# Patient Record
Sex: FEMALE | Race: BLACK OR AFRICAN AMERICAN | NOT HISPANIC OR LATINO | Employment: PART TIME | URBAN - METROPOLITAN AREA
[De-identification: names, ages, dates, MRNs, and addresses within clinical notes are randomized per-mention and may not be internally consistent; named-entity substitution may affect disease eponyms.]

---

## 2023-11-14 ENCOUNTER — HOSPITAL ENCOUNTER (EMERGENCY)
Facility: HOSPITAL | Age: 23
Discharge: HOME | End: 2023-11-14
Attending: EMERGENCY MEDICINE
Payer: COMMERCIAL

## 2023-11-14 ENCOUNTER — APPOINTMENT (OUTPATIENT)
Dept: RADIOLOGY | Facility: HOSPITAL | Age: 23
End: 2023-11-14
Payer: COMMERCIAL

## 2023-11-14 VITALS
HEIGHT: 59 IN | WEIGHT: 180 LBS | TEMPERATURE: 97 F | SYSTOLIC BLOOD PRESSURE: 119 MMHG | OXYGEN SATURATION: 98 % | DIASTOLIC BLOOD PRESSURE: 71 MMHG | HEART RATE: 88 BPM | RESPIRATION RATE: 18 BRPM | BODY MASS INDEX: 36.29 KG/M2

## 2023-11-14 DIAGNOSIS — O03.9 COMPLETE MISCARRIAGE (HHS-HCC): Primary | ICD-10-CM

## 2023-11-14 LAB
ABO GROUP (TYPE) IN BLOOD: NORMAL
ALBUMIN SERPL BCP-MCNC: 4.1 G/DL (ref 3.4–5)
ALP SERPL-CCNC: 75 U/L (ref 33–110)
ALT SERPL W P-5'-P-CCNC: 9 U/L (ref 7–45)
ANION GAP SERPL CALC-SCNC: 13 MMOL/L (ref 10–20)
APPEARANCE UR: ABNORMAL
AST SERPL W P-5'-P-CCNC: 13 U/L (ref 9–39)
B-HCG SERPL-ACNC: ABNORMAL MIU/ML
BACTERIA #/AREA URNS AUTO: ABNORMAL /HPF
BASOPHILS # BLD AUTO: 0.03 X10*3/UL (ref 0–0.1)
BASOPHILS NFR BLD AUTO: 0.3 %
BILIRUB SERPL-MCNC: 0.5 MG/DL (ref 0–1.2)
BILIRUB UR STRIP.AUTO-MCNC: NEGATIVE MG/DL
BUN SERPL-MCNC: 6 MG/DL (ref 6–23)
CALCIUM SERPL-MCNC: 9.3 MG/DL (ref 8.6–10.3)
CHLORIDE SERPL-SCNC: 106 MMOL/L (ref 98–107)
CO2 SERPL-SCNC: 22 MMOL/L (ref 21–32)
COLOR UR: YELLOW
CREAT SERPL-MCNC: 0.61 MG/DL (ref 0.5–1.05)
EOSINOPHIL # BLD AUTO: 0.01 X10*3/UL (ref 0–0.7)
EOSINOPHIL NFR BLD AUTO: 0.1 %
ERYTHROCYTE [DISTWIDTH] IN BLOOD BY AUTOMATED COUNT: 16.1 % (ref 11.5–14.5)
GFR SERPL CREATININE-BSD FRML MDRD: >90 ML/MIN/1.73M*2
GLUCOSE SERPL-MCNC: 90 MG/DL (ref 74–99)
GLUCOSE UR STRIP.AUTO-MCNC: NEGATIVE MG/DL
HCT VFR BLD AUTO: 37.6 % (ref 36–46)
HGB BLD-MCNC: 12 G/DL (ref 12–16)
HOLD SPECIMEN: NORMAL
IMM GRANULOCYTES # BLD AUTO: 0.05 X10*3/UL (ref 0–0.7)
IMM GRANULOCYTES NFR BLD AUTO: 0.4 % (ref 0–0.9)
KETONES UR STRIP.AUTO-MCNC: ABNORMAL MG/DL
LEUKOCYTE ESTERASE UR QL STRIP.AUTO: NEGATIVE
LYMPHOCYTES # BLD AUTO: 2.13 X10*3/UL (ref 1.2–4.8)
LYMPHOCYTES NFR BLD AUTO: 18.5 %
MCH RBC QN AUTO: 25.8 PG (ref 26–34)
MCHC RBC AUTO-ENTMCNC: 31.9 G/DL (ref 32–36)
MCV RBC AUTO: 81 FL (ref 80–100)
MONOCYTES # BLD AUTO: 0.51 X10*3/UL (ref 0.1–1)
MONOCYTES NFR BLD AUTO: 4.4 %
MUCOUS THREADS #/AREA URNS AUTO: ABNORMAL /LPF
NEUTROPHILS # BLD AUTO: 8.77 X10*3/UL (ref 1.2–7.7)
NEUTROPHILS NFR BLD AUTO: 76.3 %
NITRITE UR QL STRIP.AUTO: NEGATIVE
NRBC BLD-RTO: 0 /100 WBCS (ref 0–0)
PH UR STRIP.AUTO: 6 [PH]
PLATELET # BLD AUTO: 246 X10*3/UL (ref 150–450)
POTASSIUM SERPL-SCNC: 4.3 MMOL/L (ref 3.5–5.3)
PROT SERPL-MCNC: 6.6 G/DL (ref 6.4–8.2)
PROT UR STRIP.AUTO-MCNC: ABNORMAL MG/DL
RBC # BLD AUTO: 4.65 X10*6/UL (ref 4–5.2)
RBC # UR STRIP.AUTO: ABNORMAL /UL
RBC #/AREA URNS AUTO: >20 /HPF
RH FACTOR (ANTIGEN D): NORMAL
SODIUM SERPL-SCNC: 137 MMOL/L (ref 136–145)
SP GR UR STRIP.AUTO: 1
SQUAMOUS #/AREA URNS AUTO: ABNORMAL /HPF
UROBILINOGEN UR STRIP.AUTO-MCNC: <2 MG/DL
WBC # BLD AUTO: 11.5 X10*3/UL (ref 4.4–11.3)
WBC #/AREA URNS AUTO: ABNORMAL /HPF

## 2023-11-14 PROCEDURE — 76856 US EXAM PELVIC COMPLETE: CPT

## 2023-11-14 PROCEDURE — 84075 ASSAY ALKALINE PHOSPHATASE: CPT | Performed by: EMERGENCY MEDICINE

## 2023-11-14 PROCEDURE — 81001 URINALYSIS AUTO W/SCOPE: CPT | Performed by: EMERGENCY MEDICINE

## 2023-11-14 PROCEDURE — 87086 URINE CULTURE/COLONY COUNT: CPT | Mod: PARLAB | Performed by: EMERGENCY MEDICINE

## 2023-11-14 PROCEDURE — 84702 CHORIONIC GONADOTROPIN TEST: CPT | Performed by: EMERGENCY MEDICINE

## 2023-11-14 PROCEDURE — 99285 EMERGENCY DEPT VISIT HI MDM: CPT | Mod: 25 | Performed by: EMERGENCY MEDICINE

## 2023-11-14 PROCEDURE — 99284 EMERGENCY DEPT VISIT MOD MDM: CPT | Mod: 25

## 2023-11-14 PROCEDURE — 36415 COLL VENOUS BLD VENIPUNCTURE: CPT | Performed by: EMERGENCY MEDICINE

## 2023-11-14 PROCEDURE — 76815 OB US LIMITED FETUS(S): CPT | Performed by: RADIOLOGY

## 2023-11-14 PROCEDURE — 86901 BLOOD TYPING SEROLOGIC RH(D): CPT | Performed by: EMERGENCY MEDICINE

## 2023-11-14 PROCEDURE — 85025 COMPLETE CBC W/AUTO DIFF WBC: CPT | Performed by: EMERGENCY MEDICINE

## 2023-11-14 ASSESSMENT — COLUMBIA-SUICIDE SEVERITY RATING SCALE - C-SSRS
6. HAVE YOU EVER DONE ANYTHING, STARTED TO DO ANYTHING, OR PREPARED TO DO ANYTHING TO END YOUR LIFE?: NO
1. IN THE PAST MONTH, HAVE YOU WISHED YOU WERE DEAD OR WISHED YOU COULD GO TO SLEEP AND NOT WAKE UP?: NO
2. HAVE YOU ACTUALLY HAD ANY THOUGHTS OF KILLING YOURSELF?: NO

## 2023-11-14 ASSESSMENT — PAIN - FUNCTIONAL ASSESSMENT: PAIN_FUNCTIONAL_ASSESSMENT: 0-10

## 2023-11-14 ASSESSMENT — PAIN SCALES - GENERAL: PAINLEVEL_OUTOF10: 0 - NO PAIN

## 2023-11-14 NOTE — ED TRIAGE NOTES
Pt presents to ED c/o heavy vaginal bleeding following a positive pregnancy test 2 days ago. Pt states last menstrual period was at the end of September. Pt states bleeding began today. blood is dark red with small clots. Pt soaked through 1 pad in 2 hours. Pt reports abdominal cramping that began today.

## 2023-11-14 NOTE — ED PROVIDER NOTES
HPI   Chief Complaint   Patient presents with    Vaginal Bleeding - Pregnant       23-year-old female who is a G2, P1 who is early in pregnancy who presents with vaginal bleeding.  Patient states she took a pregnancy test 2 days ago found out she was pregnant.  Today she started having dark blood present.  She is having lower abdominal cramping.  Denies any back pain.  Denies any dysuria, frequency or urgency.  Her previous pregnancy was an .                          No data recorded                Patient History   Past Medical History:   Diagnosis Date    Asthma      History reviewed. No pertinent surgical history.  No family history on file.  Social History     Tobacco Use    Smoking status: Never    Smokeless tobacco: Never   Substance Use Topics    Alcohol use: Not Currently    Drug use: Never       Physical Exam   ED Triage Vitals [23 1654]   Temp Heart Rate Resp BP   36.1 °C (97 °F) 95 20 130/74      SpO2 Temp Source Heart Rate Source Patient Position   100 % Tympanic Monitor Sitting      BP Location FiO2 (%)     Right arm --       Physical Exam  Constitutional:       Appearance: Normal appearance. She is normal weight.   HENT:      Head: Normocephalic and atraumatic.      Nose: Nose normal.      Mouth/Throat:      Mouth: Mucous membranes are moist.      Pharynx: Oropharynx is clear.   Eyes:      Extraocular Movements: Extraocular movements intact.      Conjunctiva/sclera: Conjunctivae normal.      Pupils: Pupils are equal, round, and reactive to light.   Cardiovascular:      Rate and Rhythm: Normal rate and regular rhythm.   Pulmonary:      Effort: Pulmonary effort is normal.      Breath sounds: Normal breath sounds.   Abdominal:      General: Abdomen is flat. Bowel sounds are normal.      Palpations: Abdomen is soft.   Musculoskeletal:         General: Normal range of motion.      Cervical back: Normal range of motion and neck supple.   Skin:     General: Skin is warm and dry.      Capillary  Refill: Capillary refill takes less than 2 seconds.   Neurological:      General: No focal deficit present.      Mental Status: She is alert.   Psychiatric:         Mood and Affect: Mood normal.         Behavior: Behavior normal.         Thought Content: Thought content normal.         Judgment: Judgment normal.       Labs Reviewed   CBC WITH AUTO DIFFERENTIAL - Abnormal       Result Value    WBC 11.5 (*)     nRBC 0.0      RBC 4.65      Hemoglobin 12.0      Hematocrit 37.6      MCV 81      MCH 25.8 (*)     MCHC 31.9 (*)     RDW 16.1 (*)     Platelets 246      Neutrophils % 76.3      Immature Granulocytes %, Automated 0.4      Lymphocytes % 18.5      Monocytes % 4.4      Eosinophils % 0.1      Basophils % 0.3      Neutrophils Absolute 8.77 (*)     Immature Granulocytes Absolute, Automated 0.05      Lymphocytes Absolute 2.13      Monocytes Absolute 0.51      Eosinophils Absolute 0.01      Basophils Absolute 0.03     HUMAN CHORIONIC GONADOTROPIN, SERUM QUANTITATIVE - Abnormal    HCG, Beta-Quantitative 35,642 (*)     Narrative:      Total HCG measurement is performed using the [employer] Access   Immunoassay which detects intact HCG and free beta HCG subunit.    This test is not indicated for use as a tumor marker.   HCG testing is performed using a different test methodology at [facility] than other [facility]. Direct result comparison   should only be made within the same method.       COMPREHENSIVE METABOLIC PANEL - Normal    Glucose 90      Sodium 137      Potassium 4.3      Chloride 106      Bicarbonate 22      Anion Gap 13      Urea Nitrogen 6      Creatinine 0.61      eGFR >90      Calcium 9.3      Albumin 4.1      Alkaline Phosphatase 75      Total Protein 6.6      AST 13      Bilirubin, Total 0.5      ALT 9     ABORH    ABO TYPE O      Rh TYPE POS     URINALYSIS WITH REFLEX MICROSCOPIC AND CULTURE    Narrative:     The following orders were created for panel order Urinalysis with  Reflex Microscopic and Culture.  Procedure                               Abnormality         Status                     ---------                               -----------         ------                     Urinalysis with Reflex Mi...[[id number]]                      In process                 Extra Urine Gray Tube[[id number]]                             In process                   Please view results for these tests on the individual orders.   URINALYSIS WITH REFLEX MICROSCOPIC AND CULTURE   EXTRA URINE GRAY TUBE   URINALYSIS MICROSCOPIC WITH REFLEX CULTURE       US PELVIS TRANSABDOMINAL WITH TRANSVAGINAL   Final Result   An intrauterine gestational sac is not identified.  If there is a   positive serum beta HCG this represents a pregnancy of unknown   location. Differential considerations include: early nonvisualized   intrauterine pregnancy, failed intrauterine pregnancy, nonvisualized   occult ectopic pregnancy. Follow-up serial beta HCG and pelvic   ultrasound recommended to accurately assess pregnancy status.        Small amount of heterogeneous complex fluid within the endocervical   canal may relate to hemorrhagic content given provided history.        Unremarkable sonographic appearance of the ovaries.        MACRO:   None        Signed by: Ruby Mojica 11/14/2023 7:12 PM   Dictation workstation:   [id number]            ED Course & MDM   Diagnoses as of 11/14/23 2011   Complete miscarriage       Medical Decision Making  Emergency department course, on reevaluation of the patient she is having no pain.  Her quantitative hCG came back at 35,642.  Trends vaginal ultrasound showed no intrauterine gestational sac identified there is a small amount of complex fluid within the endocervical canal.  Blood type is O+.  With the elevated hCG and no findings on ultrasound will discuss with OB/GYN at [facility].  Patient currently does not have an OB she is seeing.  I discussed the case with [facility] OB   Francheska, who recommended outpatient follow-up since her exam is benign with no pain.  Patient has been instructed to follow-up for repeat quant's with an OB/GYN.  Patient is agreeable with this plan.  We will give her one of our local GYNs here at [place].        Procedure  Procedures     Lucila Mcghee DO  11/14/23 2012

## 2023-11-17 LAB — BACTERIA UR CULT: ABNORMAL

## 2023-11-18 ENCOUNTER — TELEPHONE (OUTPATIENT)
Dept: PHARMACY | Facility: HOSPITAL | Age: 23
End: 2023-11-18
Payer: COMMERCIAL

## 2023-11-18 DIAGNOSIS — B99.9 INFECTION: Primary | ICD-10-CM

## 2023-11-18 RX ORDER — CEPHALEXIN 500 MG/1
500 CAPSULE ORAL 4 TIMES DAILY
Qty: 28 CAPSULE | Refills: 0 | Status: SHIPPED | OUTPATIENT
Start: 2023-11-18 | End: 2023-11-20

## 2023-11-18 NOTE — PROGRESS NOTES
EDPD Note: Bug-Drug Mismatch    Contacted Ms. Chiquita Odonnell regarding a positive urine culture/result that was taken during their recent emergency room visit. I completed education with patient. The patient is not being treated appropriately.    Patient reports that she may have a miscarriage but is uncertain and it was not confirmed at the ED. She has an appointment with OBGYN on  for further evaluation. She reports continued bleeding, abdominal pain and flank pain and denied urinary symptoms. Due to current pregnancy (unconfirmed miscarriage) and susceptibility for her pyelonephritis, plan to treat with cephalexin 500mg QID x 7 days as the safest option until she has her OBGYN appointment and is told otherwise.    The following prescription was sent to the patient's preferred pharmacy. No further follow up needed from EDPD Team.   Drug: cephalexin 500mg   Si tab PO QID x 7 days  Days Supply: 7   Pharmacy: [place], [place]    Susceptibility data from last 90 days.  Collected Specimen Info Organism Amoxicillin/Clavulanate Ampicillin Ampicillin/Sulbactam Cefazolin Cefazolin (uncomplicated UTIs only) Ciprofloxacin Gentamicin Nitrofurantoin Piperacillin/Tazobactam Trimethoprim/Sulfamethoxazole   23 Urine from Clean Catch/Voided Klebsiella pneumoniae/variicola S R S S S S S S S S       Marilyn Walsh, PharmD      Patient called back on 2023 because her [facility] (at [address] [place] [address]) told her they never received the prescription for Keflex. The Keflex order was discontinued per [facility] chart review. Patient confirmed that she has had no urinary Sx, and never did. Unfortunately, she has experienced a miscarriage.  I explained to her that since she is not having any Sx, that antibiotics would not be warranted at this time. If Sx do develop, she should reach out to her PCP, reach out to the Culture Callback Team, or return to the ER for further workup/treatment. Patient expressed understanding  and had no further questions.    Violeta Bryson, PharmD

## 2023-11-20 ENCOUNTER — LAB (OUTPATIENT)
Dept: LAB | Facility: LAB | Age: 23
End: 2023-11-20
Payer: COMMERCIAL

## 2023-11-20 ENCOUNTER — OFFICE VISIT (OUTPATIENT)
Dept: OBSTETRICS AND GYNECOLOGY | Facility: CLINIC | Age: 23
End: 2023-11-20
Payer: COMMERCIAL

## 2023-11-20 DIAGNOSIS — Z30.09 BIRTH CONTROL COUNSELING: ICD-10-CM

## 2023-11-20 DIAGNOSIS — O36.80X0 PREGNANCY OF UNKNOWN ANATOMIC LOCATION (HHS-HCC): ICD-10-CM

## 2023-11-20 DIAGNOSIS — O36.80X0 PREGNANCY OF UNKNOWN ANATOMIC LOCATION (HHS-HCC): Primary | ICD-10-CM

## 2023-11-20 LAB — B-HCG SERPL-ACNC: 743 MIU/ML

## 2023-11-20 PROCEDURE — 99204 OFFICE O/P NEW MOD 45 MIN: CPT | Performed by: OBSTETRICS & GYNECOLOGY

## 2023-11-20 PROCEDURE — 84702 CHORIONIC GONADOTROPIN TEST: CPT

## 2023-11-20 PROCEDURE — 1036F TOBACCO NON-USER: CPT | Performed by: OBSTETRICS & GYNECOLOGY

## 2023-11-20 PROCEDURE — 36415 COLL VENOUS BLD VENIPUNCTURE: CPT

## 2023-11-20 NOTE — PROGRESS NOTES
"SUBJECTIVE    23 y.o.  Pregnant female presents for   Chief Complaint   Patient presents with    Follow-up     Vaginal bleeding   Katherynmary Noble CMA      Pt presents for ED follow up. She was seen on -- LMP was  and she presented with bleeding.  Evaluation included:  Ultrasound: 18mm heterogenous stripe with no gestational dac  Blood type: O+  BhCG 35,642.    At presentation, she was was bleeding heavily with passage of clots and tissue.  Heavy bleeding lasted the whole day; now bleeding is \"like a regular period.\"  Changing pads (now) when she uses the bathroom.  Cramping is now only intermittent.    She was also called in Keflex for a UTI.    This was an unplanned pregnancy.  She would like to be on birth control, but is unsure what she would like to be on now.    OB/GYN History  Patient's last menstrual period was 2023 (approximate).    Social History     Substance and Sexual Activity   Sexual Activity Not on file       OB History    Para Term  AB Living   3       2     SAB IAB Ectopic Multiple Live Births   1              # Outcome Date GA Lbr Rikki/2nd Weight Sex Delivery Anes PTL Lv   3 Current            2 SAB            1 AB                Past Medical History  She has a past medical history of Asthma.    Surgical History  She has no past surgical history on file.     Social History  She reports that she has never smoked. She has never used smokeless tobacco. She reports that she does not currently use alcohol. She reports that she does not use drugs.    Screenings  Social Determinants of Health     Intimate Partner Violence: Not on file   Social Connections: Not on file   Alcohol Use: Not on file   Tobacco Use: Low Risk  (2023)    Patient History     Smoking Tobacco Use: Never     Smokeless Tobacco Use: Never     Passive Exposure: Not on file   Financial Resource Strain: Not on file   Depression: Not on file   Postpartum Depression: Not on file   Stress: Not " on file   Physical Activity: Not on file   Food Insecurity: Not on file   Transportation Needs: Not on file         OBJECTIVE  There were no vitals filed for this visit.  There is no height or weight on file to calculate BMI.     OBGyn Exam deferred        There is no immunization history on file for this patient.     ASSESSMENT & PLAN  Problem List Items Addressed This Visit    None  Visit Diagnoses       Pregnancy of unknown anatomic location    -  Primary    Relevant Orders    Human Chorionic Gonadotropin, Serum Quantitative            Follow up: Will follow up BhCG levels to ensure complete passage of pregnancy. Reviewed birth control options and literature provided.  Pt will consider options.    Tariq Arreola MD  Obstetrics & Gynecology  11/20/23

## 2024-03-08 ENCOUNTER — HOSPITAL ENCOUNTER (EMERGENCY)
Facility: HOSPITAL | Age: 24
Discharge: HOME | End: 2024-03-08
Payer: COMMERCIAL

## 2024-03-08 VITALS
OXYGEN SATURATION: 100 % | RESPIRATION RATE: 16 BRPM | SYSTOLIC BLOOD PRESSURE: 117 MMHG | DIASTOLIC BLOOD PRESSURE: 82 MMHG | HEART RATE: 67 BPM | TEMPERATURE: 98.9 F

## 2024-03-08 DIAGNOSIS — K06.8 GINGIVAL BLEEDING: ICD-10-CM

## 2024-03-08 DIAGNOSIS — Z98.890 HISTORY OF RECENT DENTAL PROCEDURE: ICD-10-CM

## 2024-03-08 DIAGNOSIS — K08.89 PAIN, DENTAL: Primary | ICD-10-CM

## 2024-03-08 PROCEDURE — 99283 EMERGENCY DEPT VISIT LOW MDM: CPT

## 2024-03-08 PROCEDURE — 2500000001 HC RX 250 WO HCPCS SELF ADMINISTERED DRUGS (ALT 637 FOR MEDICARE OP): Performed by: PHYSICIAN ASSISTANT

## 2024-03-08 RX ORDER — TRAMADOL HYDROCHLORIDE 50 MG/1
50 TABLET ORAL EVERY 6 HOURS PRN
Qty: 4 TABLET | Refills: 0 | Status: SHIPPED | OUTPATIENT
Start: 2024-03-08

## 2024-03-08 RX ORDER — AMOXICILLIN 500 MG/1
500 TABLET, FILM COATED ORAL 3 TIMES DAILY
Qty: 15 TABLET | Refills: 0 | Status: SHIPPED | OUTPATIENT
Start: 2024-03-08 | End: 2024-03-13

## 2024-03-08 RX ORDER — IBUPROFEN 600 MG/1
600 TABLET ORAL ONCE
Status: COMPLETED | OUTPATIENT
Start: 2024-03-08 | End: 2024-03-08

## 2024-03-08 RX ORDER — ACETAMINOPHEN 325 MG/1
975 TABLET ORAL ONCE
Status: COMPLETED | OUTPATIENT
Start: 2024-03-08 | End: 2024-03-08

## 2024-03-08 RX ADMIN — IBUPROFEN 600 MG: 600 TABLET, FILM COATED ORAL at 08:35

## 2024-03-08 RX ADMIN — ACETAMINOPHEN 975 MG: 325 TABLET ORAL at 08:35

## 2024-03-08 ASSESSMENT — PAIN SCALES - GENERAL
PAINLEVEL_OUTOF10: 7
PAINLEVEL_OUTOF10: 7

## 2024-03-08 ASSESSMENT — PAIN - FUNCTIONAL ASSESSMENT: PAIN_FUNCTIONAL_ASSESSMENT: 0-10

## 2024-03-08 ASSESSMENT — PAIN DESCRIPTION - LOCATION: LOCATION: MOUTH

## 2024-03-08 NOTE — DISCHARGE INSTRUCTIONS
Please follow-up with your dentist within the next few days.  If bleeding continues try using teabags again.  Usually 10 to 15 minutes works.  Continue Tylenol and/or ibuprofen as needed for pain.  May take tramadol for breakthrough pain.  This is a narcotic.  Do not drive, drink alcohol or operate machinery while taking this medication.

## 2024-03-08 NOTE — ED PROVIDER NOTES
No chief complaint on file.    HPI:   Chiquita Odonnell is an 23 y.o. female with history of asthma the presents to the ED for evaluation of dental pain and gingival bleeding.  Patient states that she had a root canal completed at [facility] on Monday.  She has had bleeding since then.  She went back to the dentist yesterday and they told her that everything looked like it was healing appropriately and gave her mouthwash.  She is experiencing 8 out of 10 dental pain that is worse with chewing, talking.  Took ibuprofen at 2100 yesterday.  Has not taken medication today.  LMP 2/14/2024.  Denies chance of pregnancy.  Denies any drooling, dysphagia, neck pain, chest pain, fever or chills.  Denies history of any bleeding disorders.  Is not on anticoagulants.    Medications:  Soc HX: Occasional alcohol use  No Known Allergies: NKDA  Past Medical History:   Diagnosis Date   • Asthma      No past surgical history on file.  Family History   Problem Relation Name Age of Onset   • Hypertension Mother        Physical Exam  Vitals and nursing note reviewed.   Constitutional:       General: She is not in acute distress.     Appearance: Normal appearance. She is not ill-appearing or toxic-appearing.      Comments: Elevated BMI   HENT:      Right Ear: External ear normal.      Left Ear: External ear normal.      Nose: Nose normal.      Mouth/Throat:        Comments: Uvula midline.  Tongue normal.  2 new crowns placed over tooth 18 and 19 with slight bleeding around the medial aspect of the gingiva.  No dental abscess  Eyes:      Pupils: Pupils are equal, round, and reactive to light.   Cardiovascular:      Rate and Rhythm: Normal rate and regular rhythm.      Pulses: Normal pulses.      Heart sounds: No murmur heard.  Pulmonary:      Effort: Pulmonary effort is normal. No respiratory distress.      Breath sounds: Normal breath sounds.   Musculoskeletal:         General: No signs of injury. Normal range of motion.      Cervical  back: Normal range of motion.   Lymphadenopathy:      Cervical: No cervical adenopathy.   Skin:     General: Skin is warm and dry.      Capillary Refill: Capillary refill takes less than 2 seconds.      Findings: No bruising or rash.   Neurological:      Mental Status: She is alert.      Cranial Nerves: No cranial nerve deficit.     VS: As documented in the triage note and EMR flowsheet from this visit were reviewed.    External Records Reviewed: I reviewed recent and relevant outside records including: EMR review I am unable to find any records from patient's recent dental visits  Medical Decision Making:   ED Course as of 03/08/24 0837   Fri Mar 08, 2024   0820 Patient is 23-year-old female who presents to the ED for evaluation of dental pain and bleeding after having a root canal and 2 caps placed on Monday.  Tenderness with palpation around tooth numbers 18 and 19 both of which have them.  she has some trace amount of bleeding around the inner portion of gumline both these teeth.  Showed me pictures of them bleeding this morning.  Bleeding was more significant and appears to be healing now.  Will treat patient with medication for pain.  Will have her suck on steeped black tea bag to aid with bleeding.  Advised that she should continue to follow-up with dentistry.  Will place on prophylactic antibiotics.  Patient agreeable with this plan. [KA]   0834 Viewed [facility] with no concerning findings. [KA]      ED Course User Index  [KA] Daxa Gonzalez PA-C         Diagnoses as of 03/08/24 0837   Pain, dental   Gingival bleeding   History of recent dental procedure      Escalation of Care: Appropriate for outpatient management     Counseling: Spoke with the patient and discussed today´s findings, in addition to providing specific details for the plan of care and expected course.  Patient was given the opportunity to ask questions.    Discussed return precautions and importance of follow-up.  Advised to follow-up with  dentistry.  Advised to return to the ED for changing or worsening symptoms, new symptoms, complaint specific precautions, and precautions listed on the discharge paperwork.  Educated on the common potential side effects of medications prescribed.    I advised the patient that the emergency evaluation and treatment provided today doesn't end their need for medical care. It is very important that they follow-up with their primary care provider or other specialist as instructed.    The plan of care was mutually agreed upon with the patient. The patient and/or family were given the opportunity to ask questions. All questions asked today in the ED were answered to the best of my ability with today's information.    I specifically advised the patient to return to the ED for changing or worsening symptoms, worrisome new symptoms, or for any complaint specific precautions listed on the discharge paperwork.    This patient was cared for in the setting of nationwide stress on resources and staffing.    This report was transcribed using voice recognition software.  Every effort was made to ensure accuracy, however, inadvertently computerized transcription errors may be present.       Daxa Gonzalez PA-C  03/08/24 0838

## 2024-03-08 NOTE — ED NOTES
"23 yr. Old female admitted to ED17 for c/o bleeding s/p dental procedure this past Monday - 4 days ago she had root canals and caps placed on what appears to be her first and second left lower molars.    Dental office:  [facility] on [address]  She did followup with them recently.  States she \"spit out some blood\" this morning. Her primary concern is for pain control.  She is asking we give her something for pain.  Her last pain medication was Motrin 600mg PO last night around 9pm.  No unusual swelling or purulency appreciated of the area.  No facial swelling appreciated.      Karla London RN  03/08/24 0804    "